# Patient Record
Sex: MALE | URBAN - METROPOLITAN AREA
[De-identification: names, ages, dates, MRNs, and addresses within clinical notes are randomized per-mention and may not be internally consistent; named-entity substitution may affect disease eponyms.]

---

## 2019-08-15 ENCOUNTER — THERAPY VISIT (OUTPATIENT)
Dept: PHYSICAL THERAPY | Facility: CLINIC | Age: 29
End: 2019-08-15
Payer: COMMERCIAL

## 2019-08-15 DIAGNOSIS — M25.561 ACUTE PAIN OF RIGHT KNEE: ICD-10-CM

## 2019-08-15 DIAGNOSIS — Z47.89 ORTHOPEDIC AFTERCARE: ICD-10-CM

## 2019-08-15 PROCEDURE — 97162 PT EVAL MOD COMPLEX 30 MIN: CPT | Mod: GP | Performed by: PHYSICAL THERAPIST

## 2019-08-15 PROCEDURE — 97110 THERAPEUTIC EXERCISES: CPT | Mod: GP | Performed by: PHYSICAL THERAPIST

## 2019-08-15 NOTE — PROGRESS NOTES
[facility] Initial Evaluation  Subjective:  The history is provided by the patient.   Erasmo Cisneros being seen for R knee.   Date of Onset: 2 months ago? Where condition occurred: for unknown reasons.Problem occurred: sepsis w/subsequent surgery to clean it up in the knee  and reported as 7/10 on pain scale. General health as reported by patient is fair. Pertinent medical history includes:  Asthma, chemical dependency, depression, hepatitis, high blood pressure, kidney disease, mental illness, migraines/headaches, smoking, thyroid problems and numbness/tingling. Other medical history details: [place].   Other medical allergies details: NSAIDS, Abilify, Citalopram.  Surgeries include:  Other. Other surgery history details: R knee for sepsis.  Current medications:  Pain medication, thyroid medication, high blood pressure medication and muscle relaxants. Other medications details: blood thinner.   Primary job tasks include:  Driving.  Pain is described as aching, sharp and stabbing and is constant. Pain is the same all the time. Since onset symptoms are gradually improving.      Patient is none.   Barriers include:  None as reported by patient.  Red flags:  None as reported by patient.  Type of problem:  Right knee   Condition occurred with:  Other reason. This is a new condition   Problem details: Pt did not have any problems in his R knee/leg until he developed sepsis from a blood infection that settled in his knee. He had to have surgery to clean out the sepsis. He has been in to see his PCP after hospitalization, last seen on 7/30/19 and was referred to PT..   Patient reports pain:  Anterior and lower leg. Radiates to:  Thigh. Associated symptoms:  Buckling/giving out, loss of motion/stiffness, loss of strength and edema. Symptoms are exacerbated by bending/squatting, sitting, walking, standing, certain positions, ascending stairs, descending stairs, activity and transfers and relieved  by ice, bracing/immobilizing, analgesics, activity/movement and rest.                      Objective:    Gait:  Ace wrap on lower leg up to upper thigh  Gait Type:  Antalgic   Assistive Devices:  Cane                                                        Knee Evaluation:  ROM:    AROM      Extension: Left:    Right:  10  Flexion: Left:   Right: 95  PROM      Extension: Left:   Right:  5  Flexion: Left:   Right:  98  Pain: at end ranges for both motions    Strength:     Extension:  Right: 4-/5    Pain:+/-  Flexion:  Right: 4+/5    Pain:+/-      Quad Set Right:  Fair    Pain: -        Edema:  Edema of the knee: moderate to significant at R knee but also moderate swelling in lower leg; drainage from incision which is not fully healed.    Mobility Testing:      Patellofemoral Medial:  Right: normal  Patellofemoral Lateral:  Right: normal  Patellofemoral Superior:  Right: normal  Patellofemoral Inferior:  Right: normal        General     ROS    Assessment/Plan:    Patient is a 29 year old male with right side knee complaints.    Patient has the following significant findings with corresponding treatment plan.                Diagnosis 1:  S/p R knee sepsis w/surgical cleaning  Pain -  hot/cold therapy, self management, education and home program  Decreased ROM/flexibility - manual therapy and therapeutic exercise  Decreased strength - therapeutic exercise and therapeutic activities  Edema - self management/home program  Impaired gait - therapeutic activities  Impaired muscle performance - neuro re-education  Decreased function - therapeutic activities    Therapy Evaluation Codes:   1) History comprised of:   Personal factors that impact the plan of care:      Coping style, Overall behavior pattern, Past/current experiences and Time since onset of symptoms.    Comorbidity factors that impact the plan of care are:      Asthma, Chemical Dependency, Depression, High blood pressure, Mental illness, Migraines/headaches,  Numbness/tingling, Smoking and thyroid, kidney disease, hepatitis.     Medications impacting care: High blood pressure, Heparin/coumadin, Muscle relaxant and Pain.  2) Examination of Body Systems comprised of:   Body structures and functions that impact the plan of care:      Knee.   Activity limitations that impact the plan of care are:      Bending, Sitting, Squatting/kneeling, Stairs, Standing and Walking.  3) Clinical presentation characteristics are:   Evolving/Changing.  4) Decision-Making    Moderate complexity using standardized patient assessment instrument and/or measureable assessment of functional outcome.  Cumulative Therapy Evaluation is: Moderate complexity.    Previous and current functional limitations:  (See Goal Flow Sheet for this information)    Short term and Long term goals: (See Goal Flow Sheet for this information)     Communication ability:  Patient appears to be able to clearly communicate and understand verbal and written communication and follow directions correctly.  Treatment Explanation - The following has been discussed with the patient:   RX ordered/plan of care  Anticipated outcomes  Possible risks and side effects  This patient would benefit from PT intervention to resume normal activities.   Rehab potential is good.    Frequency:  1 X week, once daily  Duration:  for 8 weeks  Discharge Plan:  Achieve all LTG.  Independent in home treatment program.  Reach maximal therapeutic benefit.    Please refer to the daily flowsheet for treatment today, total treatment time and time spent performing 1:1 timed codes.

## 2019-08-16 PROBLEM — M25.561 ACUTE PAIN OF RIGHT KNEE: Status: ACTIVE | Noted: 2019-08-16

## 2019-08-16 PROBLEM — Z47.89 ORTHOPEDIC AFTERCARE: Status: ACTIVE | Noted: 2019-08-16

## 2019-10-22 ENCOUNTER — MEDICAL CORRESPONDENCE (OUTPATIENT)
Dept: HEALTH INFORMATION MANAGEMENT | Facility: CLINIC | Age: 29
End: 2019-10-22

## 2019-11-27 PROBLEM — Z47.89 ORTHOPEDIC AFTERCARE: Status: RESOLVED | Noted: 2019-08-16 | Resolved: 2019-11-27

## 2019-11-27 PROBLEM — M25.561 ACUTE PAIN OF RIGHT KNEE: Status: RESOLVED | Noted: 2019-08-16 | Resolved: 2019-11-27

## 2022-04-12 ENCOUNTER — APPOINTMENT (OUTPATIENT)
Dept: URBAN - METROPOLITAN AREA CLINIC 254 | Age: 32
Setting detail: DERMATOLOGY
End: 2022-04-13

## 2022-04-12 ENCOUNTER — APPOINTMENT (OUTPATIENT)
Dept: URBAN - METROPOLITAN AREA CLINIC 254 | Age: 32
Setting detail: DERMATOLOGY
End: 2022-04-12

## 2022-04-12 VITALS — HEIGHT: 70 IN | RESPIRATION RATE: 14 BRPM | WEIGHT: 210 LBS

## 2022-04-12 DIAGNOSIS — B07.8 OTHER VIRAL WARTS: ICD-10-CM

## 2022-04-12 DIAGNOSIS — R20.8 OTHER DISTURBANCES OF SKIN SENSATION: ICD-10-CM

## 2022-04-12 PROCEDURE — OTHER COUNSELING: OTHER

## 2022-04-12 PROCEDURE — 99212 OFFICE O/P EST SF 10 MIN: CPT | Mod: 25

## 2022-04-12 PROCEDURE — OTHER LIQUID NITROGEN: OTHER

## 2022-04-12 PROCEDURE — OTHER MIPS QUALITY: OTHER

## 2022-04-12 PROCEDURE — 17110 DESTRUCT B9 LESION 1-14: CPT

## 2022-04-12 PROCEDURE — OTHER PATIENT SPECIFIC COUNSELING: OTHER

## 2022-04-12 ASSESSMENT — LOCATION DETAILED DESCRIPTION DERM
LOCATION DETAILED: LEFT SCROTUM
LOCATION DETAILED: RIGHT PENIS BASE
LOCATION DETAILED: BASE OF THE PENIS
LOCATION DETAILED: LEFT SUPRAPUBIC REGION
LOCATION DETAILED: RIGHT ANTERIOR SCROTUM
LOCATION DETAILED: SUPRAPUBIC SKIN

## 2022-04-12 ASSESSMENT — LOCATION ZONE DERM
LOCATION ZONE: PENIS
LOCATION ZONE: TRUNK
LOCATION ZONE: GENITALIA

## 2022-04-12 ASSESSMENT — LOCATION SIMPLE DESCRIPTION DERM
LOCATION SIMPLE: RIGHT PENIS
LOCATION SIMPLE: LEFT SUPRAPUBIC REGION
LOCATION SIMPLE: GROIN
LOCATION SIMPLE: SCROTUM
LOCATION SIMPLE: PENIS

## 2022-04-12 NOTE — PROCEDURE: LIQUID NITROGEN
Spray Paint Text: The liquid nitrogen was applied to the skin utilizing a spray paint frosting technique.
Post-Care Instructions: I reviewed with the patient in detail post-care instructions. Patient is to wear sunprotection, and avoid picking at any of the treated lesions. Pt may apply Vaseline to crusted or scabbing areas.
Show Topical Anesthesia Variable?: Yes
Render Post-Care Instructions In Note?: no
Medical Necessity Clause: This procedure was medically necessary because the lesions that were treated were:
Consent: The patient's consent was obtained including but not limited to risks of crusting, scabbing, blistering, scarring, darker or lighter pigmentary change, recurrence, incomplete removal and infection.
Detail Level: Detailed
Medical Necessity Information: It is in your best interest to select a reason for this procedure from the list below. All of these items fulfill various CMS LCD requirements except the new and changing color options.

## 2022-04-12 NOTE — PROCEDURE: MIPS QUALITY
Detail Level: Detailed
Quality 431: Preventive Care And Screening: Unhealthy Alcohol Use - Screening: Patient not identified as an unhealthy alcohol user when screened for unhealthy alcohol use using a systematic screening method
Quality 110: Preventive Care And Screening: Influenza Immunization: Influenza immunization was not ordered or administered, reason not given
Quality 130: Documentation Of Current Medications In The Medical Record: Current Medications Documented
Quality 402: Tobacco Use And Help With Quitting Among Adolescents: Patient screened for tobacco and is a smoker AND received Cessation Counseling

## 2022-05-16 ENCOUNTER — APPOINTMENT (OUTPATIENT)
Dept: URBAN - METROPOLITAN AREA CLINIC 254 | Age: 32
Setting detail: DERMATOLOGY
End: 2022-05-16

## 2022-05-16 VITALS — WEIGHT: 190 LBS | HEIGHT: 70 IN

## 2022-05-16 DIAGNOSIS — A63.0 ANOGENITAL (VENEREAL) WARTS: ICD-10-CM

## 2022-05-16 PROCEDURE — OTHER MIPS QUALITY: OTHER

## 2022-05-16 PROCEDURE — 54056 CRYOSURGERY PENIS LESION(S): CPT

## 2022-05-16 PROCEDURE — OTHER LIQUID NITROGEN GENITALS: OTHER

## 2022-05-16 PROCEDURE — OTHER COUNSELING: OTHER

## 2022-05-16 ASSESSMENT — LOCATION SIMPLE DESCRIPTION DERM
LOCATION SIMPLE: SCROTUM
LOCATION SIMPLE: PENIS

## 2022-05-16 ASSESSMENT — LOCATION DETAILED DESCRIPTION DERM
LOCATION DETAILED: DORSAL PENILE SHAFT
LOCATION DETAILED: LEFT SCROTUM

## 2022-05-16 ASSESSMENT — LOCATION ZONE DERM
LOCATION ZONE: PENIS
LOCATION ZONE: GENITALIA

## 2022-05-16 NOTE — PROCEDURE: COUNSELING
Patient Specific Counseling (Will Not Stick From Patient To Patient): - Recommended treatment with liquid nitrogen cryosurgery.\\n- Advised that multiple treatments is often necessary.
Detail Level: Detailed

## 2022-05-16 NOTE — PROCEDURE: LIQUID NITROGEN GENITALS
Detail Level (Bills Only For Genitals Or Anus, Choose Benign Destruction If You Are Treating A Different Area): Detailed
Render Post-Care Instructions In Note?: yes
Consent: - Verbal and written consent was obtained, and risks were reviewed prior to procedure today. Risks discussed include but are not limited to pain, crusting, scabbing, blistering, scarring, temporary or permanent darker or lighter pigmentary change, recurrence, incomplete resolution, and infection.
Post-Care Instructions: - Avoid picking at any of the treated lesions.

## 2022-05-16 NOTE — HPI: SKIN LESIONS
Is This A New Presentation, Or A Follow-Up?: Growths
Additional History: Had genital warts treated a few weeks ago and many but not all resolved. Tolerated well.

## 2023-03-23 ENCOUNTER — LAB REQUISITION (OUTPATIENT)
Dept: LAB | Facility: CLINIC | Age: 33
End: 2023-03-23
Payer: COMMERCIAL

## 2023-03-23 DIAGNOSIS — I10 ESSENTIAL (PRIMARY) HYPERTENSION: ICD-10-CM

## 2023-03-23 PROCEDURE — 80053 COMPREHEN METABOLIC PANEL: CPT | Mod: ORL | Performed by: FAMILY MEDICINE

## 2023-03-23 PROCEDURE — 80061 LIPID PANEL: CPT | Mod: ORL | Performed by: FAMILY MEDICINE

## 2023-03-24 LAB
ALBUMIN SERPL BCG-MCNC: 4.6 G/DL (ref 3.5–5.2)
ALP SERPL-CCNC: 90 U/L (ref 40–129)
ALT SERPL W P-5'-P-CCNC: 22 U/L (ref 10–50)
ANION GAP SERPL CALCULATED.3IONS-SCNC: 16 MMOL/L (ref 7–15)
AST SERPL W P-5'-P-CCNC: 37 U/L (ref 10–50)
BILIRUB SERPL-MCNC: 0.5 MG/DL
BUN SERPL-MCNC: 14.2 MG/DL (ref 6–20)
CALCIUM SERPL-MCNC: 9.4 MG/DL (ref 8.6–10)
CHLORIDE SERPL-SCNC: 103 MMOL/L (ref 98–107)
CHOLEST SERPL-MCNC: 210 MG/DL
CREAT SERPL-MCNC: 1.01 MG/DL (ref 0.67–1.17)
DEPRECATED HCO3 PLAS-SCNC: 22 MMOL/L (ref 22–29)
GFR SERPL CREATININE-BSD FRML MDRD: >90 ML/MIN/1.73M2
GLUCOSE SERPL-MCNC: 89 MG/DL (ref 70–99)
HDLC SERPL-MCNC: 60 MG/DL
LDLC SERPL CALC-MCNC: 134 MG/DL
NONHDLC SERPL-MCNC: 150 MG/DL
POTASSIUM SERPL-SCNC: 3.7 MMOL/L (ref 3.4–5.3)
PROT SERPL-MCNC: 7.7 G/DL (ref 6.4–8.3)
SODIUM SERPL-SCNC: 141 MMOL/L (ref 136–145)
TRIGL SERPL-MCNC: 81 MG/DL

## 2024-08-29 ENCOUNTER — APPOINTMENT (OUTPATIENT)
Dept: URBAN - METROPOLITAN AREA CLINIC 254 | Age: 34
Setting detail: DERMATOLOGY
End: 2024-08-30

## 2024-08-29 DIAGNOSIS — A63.0 ANOGENITAL (VENEREAL) WARTS: ICD-10-CM

## 2024-08-29 DIAGNOSIS — L71.0 PERIORAL DERMATITIS: ICD-10-CM

## 2024-08-29 PROCEDURE — 54050 DESTRUCTION PENIS LESION(S): CPT

## 2024-08-29 PROCEDURE — OTHER BENIGN DESTRUCTION (GENITALS): OTHER

## 2024-08-29 PROCEDURE — OTHER PRESCRIPTION MEDICATION MANAGEMENT: OTHER

## 2024-08-29 PROCEDURE — OTHER MIPS QUALITY: OTHER

## 2024-08-29 PROCEDURE — OTHER PRESCRIPTION: OTHER

## 2024-08-29 PROCEDURE — 99214 OFFICE O/P EST MOD 30 MIN: CPT | Mod: 25

## 2024-08-29 PROCEDURE — OTHER COUNSELING: OTHER

## 2024-08-29 RX ORDER — MINOCYCLINE HYDROCHLORIDE 100 MG/1
CAPSULE ORAL BID
Qty: 60 | Refills: 0 | Status: ERX | COMMUNITY
Start: 2024-08-29

## 2024-08-29 RX ORDER — METRONIDAZOLE 7.5 MG/G
CREAM TOPICAL QD
Qty: 45 | Refills: 0 | Status: ERX | COMMUNITY
Start: 2024-08-29

## 2024-08-29 ASSESSMENT — LOCATION ZONE DERM: LOCATION ZONE: PENIS

## 2024-08-29 ASSESSMENT — LOCATION DETAILED DESCRIPTION DERM
LOCATION DETAILED: BASE OF THE PENIS
LOCATION DETAILED: RIGHT DORSAL SHAFT OF PENIS

## 2024-08-29 ASSESSMENT — LOCATION SIMPLE DESCRIPTION DERM: LOCATION SIMPLE: PENIS

## 2024-08-29 NOTE — PROCEDURE: PRESCRIPTION MEDICATION MANAGEMENT
Initiate Treatment: minocycline 100 mg capsule BID\\nmetronidazole 0.75 % topical cream QD
Detail Level: Zone
Render In Strict Bullet Format?: No

## 2024-08-29 NOTE — PROCEDURE: BENIGN DESTRUCTION (GENITALS)
Post-Care Instructions: I reviewed with the patient in detail post-care instructions. Wash off Podocon in 4-5 hours.Patient is to wear sunprotection, and avoid picking at any of the treated lesions. Pt may apply Vaseline to crusted or scabbing areas.
Medical Necessity Clause: This procedure was medically necessary because the lesions that were treated were:
Detail Level: Detailed
Warning: This plan will only bill for destructions on the Penis and Vulva. If you select the Scrotum it will not bill.
Consent: The patient's consent was obtained including but not limited to risks of crusting, scabbing, blistering, scarring, darker or lighter pigmentary change, recurrence, incomplete removal and infection.
Include Z78.9 (Other Specified Conditions Influencing Health Status) As An Associated Diagnosis?: No
Anesthesia Volume In Cc: 0
Medical Necessity Information: It is in your best interest to select a reason for this procedure from the list below. All of these items fulfill various CMS LCD requirements except the new and changing color options.
Method: liquid nitrogen followed by Podophyllin 25%
Render Post-Care Instructions In Note?: yes

## 2024-08-29 NOTE — HPI: SKIN LESION
What Type Of Note Output Would You Prefer (Optional)?: Standard Output
Has Your Skin Lesion Been Treated?: not been treated
Is This A New Presentation, Or A Follow-Up?: Skin Lesions
Additional History: The patient reports a history of genital warts that he has been treating with liquid nitrogen. His last treatment was in May of 2022, and he states that the warts have not changed in size since then. Patient denies any associated pain or irritation secondary to the warts. He would like liquid nitrogen treatment today.

## 2025-03-21 NOTE — PROCEDURE: COUNSELING
Care Management Initial Assessment  3/21/2025 12:19 PM  If patient is discharged prior to next notation, then this note serves as note for discharge by case management.    Reason for Admission:   Bacteremia [R78.81]         Patient Admission Status: Inpatient  Date Admitted to [place]: 3/21  RUR: Readmission Risk Score: 8.5    Hospitalization in the last 30 days (Readmission):  No        Advance Care Planning:  Code Status: Full Code  Primary Healthcare Decision Maker: (P) Legal Next of Kin   Advance Directive: has an advanced directive - a copy HAS NOT been provided.     __________________________________________________________________________  Assessment:      03/21/25 1218   Service Assessment   Patient Orientation Alert and Oriented   Cognition Alert   History Provided By Patient;Medical Record   Primary Caregiver Self   Support Systems Spouse/Significant Other;Family Members   Patient's Healthcare Decision Maker is: Legal Next of Kin   PCP Verified by CM Yes  (Dr Annie Balbuena)   Last Visit to PCP Within last 3 months   Prior Functional Level Independent in ADLs/IADLs   Current Functional Level Independent in ADLs/IADLs   Can patient return to prior living arrangement Yes   Ability to make needs known: Good   Family able to assist with home care needs: Yes   Would you like for me to discuss the discharge plan with any other family members/significant others, and if so, who? No   Financial Resources Medicare;Other (Comment)  ([place] [employer] secondary)   Community Resources None   Social/Functional History   Lives With Spouse   Discharge Planning   Patient expects to be discharged to: House   History of falls? 0   Services At/After Discharge   Transition of Care Consult (CM Consult) N/A   Services At/After Discharge None   Mode of Transport at Discharge Self   Confirm Follow Up Transport Family   Condition of Participation: Discharge Planning   The Plan for Transition of Care is related to the following treatment  Detail Level: Detailed